# Patient Record
Sex: MALE | Race: WHITE
[De-identification: names, ages, dates, MRNs, and addresses within clinical notes are randomized per-mention and may not be internally consistent; named-entity substitution may affect disease eponyms.]

---

## 2019-01-26 ENCOUNTER — HOSPITAL ENCOUNTER (EMERGENCY)
Dept: HOSPITAL 74 - JER | Age: 29
Discharge: HOME | End: 2019-01-26
Payer: SELF-PAY

## 2019-01-26 VITALS — DIASTOLIC BLOOD PRESSURE: 68 MMHG | TEMPERATURE: 98.2 F | HEART RATE: 82 BPM | SYSTOLIC BLOOD PRESSURE: 132 MMHG

## 2019-01-26 VITALS — BODY MASS INDEX: 27.8 KG/M2

## 2019-01-26 DIAGNOSIS — R07.9: Primary | ICD-10-CM

## 2019-01-26 DIAGNOSIS — K21.9: ICD-10-CM

## 2019-01-26 DIAGNOSIS — R42: ICD-10-CM

## 2019-01-26 DIAGNOSIS — R94.5: ICD-10-CM

## 2019-01-26 LAB
ALBUMIN SERPL-MCNC: 4.3 G/DL (ref 3.4–5)
ALP SERPL-CCNC: 115 U/L (ref 45–117)
ALT SERPL-CCNC: 305 U/L (ref 13–61)
ANION GAP SERPL CALC-SCNC: 5 MMOL/L (ref 8–16)
AST SERPL-CCNC: 83 U/L (ref 15–37)
BASOPHILS # BLD: 1.1 % (ref 0–2)
BILIRUB SERPL-MCNC: 0.2 MG/DL (ref 0.2–1)
BUN SERPL-MCNC: 15 MG/DL (ref 7–18)
CALCIUM SERPL-MCNC: 8.7 MG/DL (ref 8.5–10.1)
CHLORIDE SERPL-SCNC: 105 MMOL/L (ref 98–107)
CO2 SERPL-SCNC: 29 MMOL/L (ref 21–32)
CREAT SERPL-MCNC: 0.9 MG/DL (ref 0.55–1.3)
DEPRECATED RDW RBC AUTO: 14.3 % (ref 11.9–15.9)
EOSINOPHIL # BLD: 1.9 % (ref 0–4.5)
GLUCOSE SERPL-MCNC: 112 MG/DL (ref 74–106)
HCT VFR BLD CALC: 41.1 % (ref 35.4–49)
HGB BLD-MCNC: 14.5 GM/DL (ref 11.7–16.9)
LYMPHOCYTES # BLD: 24.5 % (ref 8–40)
MAGNESIUM SERPL-MCNC: 2 MG/DL (ref 1.8–2.4)
MCH RBC QN AUTO: 29.1 PG (ref 25.7–33.7)
MCHC RBC AUTO-ENTMCNC: 35.4 G/DL (ref 32–35.9)
MCV RBC: 82.3 FL (ref 80–96)
MONOCYTES # BLD AUTO: 10.9 % (ref 3.8–10.2)
NEUTROPHILS # BLD: 61.6 % (ref 42.8–82.8)
PLATELET # BLD AUTO: 177 K/MM3 (ref 134–434)
PMV BLD: 9.2 FL (ref 7.5–11.1)
POTASSIUM SERPLBLD-SCNC: 4.3 MMOL/L (ref 3.5–5.1)
PROT SERPL-MCNC: 8 G/DL (ref 6.4–8.2)
RBC # BLD AUTO: 5 M/MM3 (ref 4–5.6)
SODIUM SERPL-SCNC: 138 MMOL/L (ref 136–145)
WBC # BLD AUTO: 7.5 K/MM3 (ref 4–10)

## 2019-01-26 NOTE — PDOC
Attending Attestation





- HPI


HPI: 





01/26/19 01:55


The patient is a 28 year old male, with a significant past medical history of 

GERD, who presents to the emergency department with s/p 1 episode of chest pain 

and near syncope. Patient endorses approximately 45 min to his arrival while 

trying to defecate (constipated for 2 days), he began to have chest tightness 

radiating down to his left arm, with associated palpitations and 

lightheadedness. He admits during said episode he was straining significantly.





The patient denies shortness of breath or headache .


The patient denies fever or chills.


The patient denies dysuria, frequency, urgency and hematuria.





Allergies: NKDA


Past surgical history: None reported.


Social history: None reported.








<Tuan Varela - Last Filed: 01/26/19 01:55>





- Medical Decision Making





01/26/19 02:05


GENERAL: Awake, alert, and fully oriented, in no acute distress


HEAD: No signs of trauma


EYES: PERRLA, EOMI, sclera anicteric, conjunctiva clear


ENT: Auricles normal inspection, hearing grossly normal, nares patent, 

oropharynx clear without exudates. Moist mucosa


NECK: Normal ROM, supple, no lymphadenopathy, JVD, or masses


LUNGS: Breath sounds equal, clear to auscultation bilaterally.  No wheezes, and 

no crackles


HEART: Regular rate and rhythm, normal S1 and S2, no murmurs, rubs or gallops


ABDOMEN: Soft, nontender, normoactive bowel sounds.  No guarding, no rebound.  

No masses


EXTREMITIES: Normal range of motion, no edema.  No clubbing or cyanosis. No 

cords, erythema, or tenderness


NEUROLOGICAL: Cranial nerves II through XII grossly intact.  Normal speech, 

normal gait


SKIN: Warm, Dry, normal turgor, no rashes or lesions noted.








Documentation prepared by Jesús Chu, acting as medical scribe for 

Micki Connor MD.





<Jesús Chu - Last Filed: 01/26/19 02:05>





- Resident


Resident Name: Nyasia Saleem





- ED Attending Attestation


I have performed the following: I have examined & evaluated the patient, The 

case was reviewed & discussed with the resident, I agree w/resident's findings 

& plan





- HPI


HPI: 





01/26/19 02:36


Pt comes with chest pain while straining on the toilet.


01/26/19 02:37


Resolved in the ER





- Physicial Exam


PE: 





01/26/19 02:37


Normal exam; agree with resident


01/26/19 02:37


GENERAL: Awake, alert, and fully oriented, in no acute distress


HEAD: No signs of trauma


EYES: PERRLA, EOMI, sclera anicteric, conjunctiva clear


ENT: Auricles normal inspection, hearing grossly normal, nares patent, 

oropharynx clear without exudates. Moist mucosa


NECK: Normal ROM, supple, no lymphadenopathy, JVD, or masses


LUNGS: Breath sounds equal, clear to auscultation bilaterally.  No wheezes, and 

no crackles


HEART: Regular rate and rhythm, normal S1 and S2, no murmurs, rubs or gallops


ABDOMEN: Soft, nontender, normoactive bowel sounds.  No guarding, no rebound.  

No masses


EXTREMITIES: Normal range of motion, no edema.  No clubbing or cyanosis. No 

cords, erythema, or tenderness


NEUROLOGICAL: Cranial nerves II through XII grossly intact.  Normal speech, 

normal gait


SKIN: Warm, Dry, normal turgor, no rashes or lesions noted.








Documentation prepared by Jesús Chu, acting as medical scribe for 

Micki Connor MD.





- Medical Decision Making








01/26/19 02:37


CXR normal; EKG NSR; 


Pt's labs normal. He was advised to eat better diet -more fruits and veggies.





<Micki Connor - Last Filed: 01/26/19 02:38>





**Heart Score/ECG Review





- ECG Intrepretation


Rhythm: Regular Rhythm





- Axis


Axis: Normal





- P and TX


Delta Wave(s) Present: No


WPW: No





- QRS


Poor R Wave Progression: No


Q Wave Present: No





- ST and T


Early Repolarization: No


Non Specific ST-T Wave changes: No


Flattened T Waves: No





- ECG Impressions


Normal ECG: Yes


Non-specific ST Elevation: No


Ischemic Changes: No


Bradycardia: No


Torsades dalia Pointes: No


WPW: No





<Micki Connor - Last Filed: 01/26/19 02:38>





Attestations





- Attestations





01/26/19 01:56





Documentation prepared by Tuan Varela, acting as medical scribe for Micki Connor MD





<Tuan Varela - Last Filed: 01/26/19 01:55>

## 2019-01-26 NOTE — EKG
Test Reason : 

Blood Pressure : ***/*** mmHG

Vent. Rate : 097 BPM     Atrial Rate : 097 BPM

   P-R Int : 160 ms          QRS Dur : 090 ms

    QT Int : 356 ms       P-R-T Axes : 049 030 012 degrees

   QTc Int : 452 ms

 

NORMAL SINUS RHYTHM

POSSIBLE LEFT ATRIAL ENLARGEMENT

NO PREVIOUS ECGS AVAILABLE

Confirmed by TIM BUI MD ([id number]) on 1/26/2019 1:06:37 PM

 

Referred By:             Confirmed By:TIM BUI MD

## 2019-01-26 NOTE — PDOC
History of Present Illness





- General


Chief Complaint: Chest Pain


Stated Complaint: CHEST PAIN


Time Seen by Provider: 01/26/19 01:01


History Source: Patient


Exam Limitations: No Limitations





- History of Present Illness


Initial Comments: 





01/26/19 01:13


28YOM who is recovering from distant alcohol use disorder, also h/o GERD (on 

Prilosec), who p/w abrupt onset of lightheadedness, pre-syncope, palpitations, 

and left chest tightness radiating down his left arm onset about 45 minutes PTA 

to the ED. He notes being constipated for the past 2 days and he was sitting on 

the toilet during the onset, straining to have a bowel movement. He has never 

had symptoms like this before. He denies any recent f/c/n/v/d, numbness, 

tingling, focal weakness, headache, SOB, abdominal pain, or other symptoms. He 

smokes 5 cigarettes/day and has no known FHx of cardiac disorders, MI, or 

sudden unexplained death.





Past History





- Past Medical History


Allergies/Adverse Reactions: 


 Allergies











Allergy/AdvReac Type Severity Reaction Status Date / Time


 


No Known Allergies Allergy   Verified 01/26/19 00:28











Home Medications: 


Ambulatory Orders





Omeprazole Magnesium [Prilosec] 20 mg PO DAILY 01/26/19 








Anemia: No


COPD: No





- Suicide/Smoking/Psychosocial Hx


Smoking History: Never smoked


Have you smoked in the past 12 months: No


Information on smoking cessation initiated: No


Hx Alcohol Use: No


Drug/Substance Use Hx: No





**Review of Systems





- Review of Systems


Able to Perform ROS?: Yes


Comments:: 





01/26/19 01:19


GEN: no fever, chills, malaise, generalized weakness, or weight change


HEENT: no ear pain, sore throat, vision change, or eye pain


CV: chest pain, palpitations, lightheadedness, no syncope, or edema


RESP: no cough, wheezing, or SOB


GI: constipation, no nausea, vomiting, diarrhea, or white/black/bloody stool


: no dysuria, hematuria, incontinence, retention, bleeding, or discharge 


MSK: no neck/back pain, muscle weakness/pain, or joint swelling/pain


NEURO: no headache, seizure, vertigo, numbness, tingling, or focal weakness


PSYCH: no substance use, no behavior change


SKIN: no jaundice, no rash


ROS otherwise negative except as noted in HPI





*Physical Exam





- Vital Signs


 Last Vital Signs











Temp Pulse Resp BP Pulse Ox


 


 98.6 F   103 H  18   150/85   98 


 


 01/26/19 00:28  01/26/19 00:28  01/26/19 00:28  01/26/19 00:28  01/26/19 00:28














- Physical Exam


Comments: 





01/26/19 01:20


GENERAL: well-appearing, A/Ox4, no distress, answers questions appropriately, 

accompanied by partner at bedside


HEENT: PERRLA, EOMI, moist mucous membranes


NECK/BACK: no midline ttp, no spinal stepoff or deformity, no hematoma, full ROM

, neck supple


CARDIOVASCULAR: regular rate/rhythm with mild tachycardia to 100 bpm, normal 

S1S2, no MGR, strong peripheral pulses, capillary refill <2 seconds, 

extremities wwp, no edema


LUNGS/RESPIRATORY: no respiratory distress, CTAB


GI/ABDOMEN: symmetric side-to-side, normoactive BS, soft, no ttp, no midline 

pulsatile masses


: no CVA tenderness


EXTREMITIES: no muscle atrophy, no acute deformity


SKIN: warm and dry, no pallor, no jaundice, no rash, no bruising, no skin 

breakdown, no cuts, no lesions


NEUROLOGICAL: GCS 15, CN II-XII grossly intact, 5/5 strength proximally and 

distally, no facial droop, no nystagmus, normal gait, normal Romberg, no 

pronator drift





Moderate Sedation





- Procedure Monitoring


Vital Signs: 


Procedure Monitoring Vital Signs











Temperature  98.6 F   01/26/19 00:28


 


Pulse Rate  103 H  01/26/19 00:28


 


Respiratory Rate  18   01/26/19 00:28


 


Blood Pressure  150/85   01/26/19 00:28


 


O2 Sat by Pulse Oximetry (%)  98   01/26/19 00:28











ED Treatment Course





- LABORATORY


CBC & Chemistry Diagram: 


 01/26/19 01:34





 01/26/19 01:34





- ADDITIONAL ORDERS


Additional order review: 


 Laboratory  Results











  01/26/19 01/26/19





  01:34 01:34


 


Sodium   138


 


Potassium   4.3


 


Chloride   105


 


Carbon Dioxide   29


 


Anion Gap   5 L


 


BUN   15


 


Creatinine   0.9


 


Creat Clearance w eGFR   > 60


 


Random Glucose   112 H


 


Calcium   8.7


 


Magnesium   2.0


 


Total Bilirubin   0.2


 


AST   83 H


 


ALT   305 H


 


Alkaline Phosphatase   115


 


Creatine Kinase  375 H 


 


Creatine Kinase Index  0.6 


 


CK-MB (CK-2)  2.6 


 


Troponin I  < 0.02 


 


Total Protein   8.0


 


Albumin   4.3








 











  01/26/19





  01:34


 


RBC  5.00


 


MCV  82.3


 


MCHC  35.4


 


RDW  14.3


 


MPV  9.2


 


Neutrophils %  61.6


 


Lymphocytes %  24.5


 


Monocytes %  10.9 H


 


Eosinophils %  1.9


 


Basophils %  1.1














- RADIOLOGY


Radiology Studies Ordered: 














 Category Date Time Status


 


 CHEST PA & LAT [RAD] Stat Radiology  01/26/19 01:02 Taken














- Medications


Given in the ED: 


ED Medications














Discontinued Medications














Generic Name Dose Route Start Last Admin





  Trade Name Freq  PRN Reason Stop Dose Admin


 


Sodium Chloride  1,000 ml  01/26/19 01:13  01/26/19 01:43





  Normal Saline -  IV  01/26/19 01:14  1,000 ml





  ONCE ONE   Administration





     





     





     





     














Medical Decision Making





- Medical Decision Making





01/26/19 01:21


Pt p/w abrupt transient near-LOC with, followed by spontaneous recovery, 

consistent with presyncope.





 Initial Vital Signs











Temp Pulse Resp BP Pulse Ox


 


 98.6 F   103 H  18   150/85   98 


 


 01/26/19 00:28  01/26/19 00:28  01/26/19 00:28  01/26/19 00:28  01/26/19 00:28








Exam: As noted in Physical Exam section.


DDX IBNLT: reflex (neurocardiogenic e.g. vasovagal; situational e.g. micturition

/post-tussive/post-exercise; carotid sinus hypersensitivity), cardiovascular (

arrhythmia e.g. sick sinus syndrome, conduction abnormality e.g. SVT/WPW/Brugada

/long QT/ventricular dysrhythmia, structural heart disease e.g. valvular disease

/HOCM/left atrial myxoma/MI; PE; cardiac tamponade), orthostatic hypotension (

volume depletion e.g. hemorrhage/vomiting/diarrhea/diuretics; drugs e.g. 

vasodilators/alpha-1-blockers/clonidine/phenothiazines like Haldol; autonomic 

failure e.g. spinal cord injury/DM neuropathy/Parkinsons), or other causes not 

true syncope d/t subsequent neuro deficit (TIA/CVA, SAH, seizure, metabolic/

electrolyte derangement e.g. DM/DKA tend to cause gradual slide into 

unconsciousness), pregnancy, infection/sepsis/vitals abnormalities, etc.


DDX for head injury IBNLT: simple laceration, scalp contusion, concussion, ICH, 

skull fracture, facial bone fracture


W/U ordered: EKG CXR labs


TX ordered: IVF





EKG: Reviewed; results as noted in ECG Review section.


CXR: 





Labs: 





Repeat VS:  





DISCHARGE


Per [place] Syncope Rule: Pt is low risk for ventricular dysrhythmia and/

or death.


Pt also reports no exertional syncope and no sudden death in a relative.


No systolic murmur of AS or HOCM is heard.


On EKG no e/o WPW, Brugada, epsilon wave, right heart strain, long QT, or other 

concern.


Workup is not concerning for emergency-level pathology at this time.


The Pt is appropriate for discharge with close outpatient follow up.


They are comfortable with this plan and will follow up with their primary care 

provider in 1-3 days.


They are counseled to stay well hydrated and eat a balanced diet.


Referral information is given for cardiologist on-call and Pt is encouraged to f

/u with cardiology.


Return precautions for syncope and laceration repair are discussed and they 

will come back to the ED if necessary.





*DC/Admit/Observation/Transfer


Diagnosis at time of Disposition: 


 Lightheadedness, LFT elevation





Chest pain


Qualifiers:


 Chest pain type: unspecified Qualified Code(s): R07.9 - Chest pain, unspecified








- Discharge Dispostion


Disposition: HOME


Condition at time of disposition: Stable


Decision to Admit order: No





- Referrals





- Patient Instructions


Printed Discharge Instructions:  DI for Chest Pain


Additional Instructions: 


You were seen in the ER for lightheadedness, palpitations, and chest pain. We 

did lab work on the blood, an electrocardiogram, a chest x-ray, and we did not 

find any concerning abnormalities. All your symptoms had resolved by the end of 

your ER visit. After our assessment, we do not believe you are having a medical 

emergency at this time, and we believe you are safe to go home. Please stay 

well hydrated and eat a balanced diet, and take your regular medications like 

you are supposed to. Please follow up with a primary care provider(s) in 1-3 

days. We are giving you referral information in this packet so that you can 

establish care. Call their clinic as soon as possible, tell them you were seen 

in the ER, and tell them you need an appointment. If you have any new or 

worsening symptoms, especially fainting, worsening palpitations or chest 

discomfort, shortness of breath, sweats, nausea, or other symptoms, please come 

back to the ER at any time (24 hours a day). If you are having severe or life 

threatening symptoms, or symptoms that make it unsafe to drive or have someone 

drive you, please call 911.





Your liver function tests were elevated today on your laboratories. It is not 

an emergency at this time, but it is very important that you follow up with a 

primary doctor to have repeat laboratory testing.





For the constipation,  some MiraLax at the pharmacy, which you can get 

over-the-counter. Take it as directed on the bottle.





- Post Discharge Activity